# Patient Record
Sex: MALE | NOT HISPANIC OR LATINO
[De-identification: names, ages, dates, MRNs, and addresses within clinical notes are randomized per-mention and may not be internally consistent; named-entity substitution may affect disease eponyms.]

---
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
## 2024-12-18 NOTE — ED BEHAVIORAL HEALTH ASSESSMENT NOTE - OCCUPATION
Add High Risk Medication Management Associated Diagnosis?: No Detail Level: Zone Comments: Pt is happy with this treatment. Length Of Therapy: 3+ years Works part time in a restaurant.